# Patient Record
Sex: MALE | Race: WHITE | NOT HISPANIC OR LATINO | Employment: UNEMPLOYED | URBAN - METROPOLITAN AREA
[De-identification: names, ages, dates, MRNs, and addresses within clinical notes are randomized per-mention and may not be internally consistent; named-entity substitution may affect disease eponyms.]

---

## 2020-01-24 ENCOUNTER — HISTORICAL (OUTPATIENT)
Dept: LAB | Facility: HOSPITAL | Age: 40
End: 2020-01-24

## 2020-01-24 LAB
ABS NEUT (OLG): 4 X10(3)/MCL (ref 1.5–6.9)
ALBUMIN SERPL-MCNC: 4 GM/DL (ref 3.4–5)
ALBUMIN/GLOB SERPL: 1 RATIO
ALP SERPL-CCNC: 72 UNIT/L (ref 30–113)
ALT SERPL-CCNC: 21 UNIT/L (ref 10–45)
AST SERPL-CCNC: 23 UNIT/L (ref 15–37)
BASOPHILS # BLD AUTO: 0.1 X10(3)/MCL (ref 0–0.1)
BASOPHILS NFR BLD AUTO: 1 % (ref 0–1)
BILIRUB SERPL-MCNC: 0.7 MG/DL (ref 0.1–0.9)
BILIRUBIN DIRECT+TOT PNL SERPL-MCNC: 0.1 MG/DL (ref 0–0.3)
BILIRUBIN DIRECT+TOT PNL SERPL-MCNC: 0.6 MG/DL
BUN SERPL-MCNC: 17 MG/DL (ref 10–20)
CALCIUM SERPL-MCNC: 8.9 MG/DL (ref 8–10.5)
CHLORIDE SERPL-SCNC: 105 MMOL/L (ref 100–108)
CHOLEST SERPL-MCNC: 157 MG/DL (ref 140–200)
CHOLEST/HDLC SERPL: 3 MG/DL (ref 0–8)
CO2 SERPL-SCNC: 30 MMOL/L (ref 21–35)
CREAT SERPL-MCNC: 0.9 MG/DL (ref 0.7–1.3)
EOSINOPHIL # BLD AUTO: 0.1 X10(3)/MCL (ref 0–0.6)
EOSINOPHIL NFR BLD AUTO: 2 % (ref 0–5)
ERYTHROCYTE [DISTWIDTH] IN BLOOD BY AUTOMATED COUNT: 11.7 % (ref 11.5–17)
GLOBULIN SER-MCNC: 3.9 GM/DL
GLUCOSE SERPL-MCNC: 86 MG/DL (ref 75–116)
HCT VFR BLD AUTO: 42.2 % (ref 42–52)
HDLC SERPL-MCNC: 46 MG/DL (ref 35–59)
HGB BLD-MCNC: 14 GM/DL (ref 14–18)
HIV 1+2 AB+HIV1 P24 AG SERPL QL IA: NEGATIVE
IMM GRANULOCYTES # BLD AUTO: 0.03 10*3/UL (ref 0–0.02)
IMM GRANULOCYTES NFR BLD AUTO: 0.4 % (ref 0–0.43)
LDLC SERPL CALC-MCNC: 104 MG/DL (ref 100–129)
LYMPHOCYTES # BLD AUTO: 2.6 X10(3)/MCL (ref 0.5–4.1)
LYMPHOCYTES NFR BLD AUTO: 34 % (ref 15–40)
MCH RBC QN AUTO: 31 PG (ref 27–34)
MCHC RBC AUTO-ENTMCNC: 33 GM/DL (ref 31–36)
MCV RBC AUTO: 95 FL (ref 80–99)
MONOCYTES # BLD AUTO: 0.8 X10(3)/MCL (ref 0–1.1)
MONOCYTES NFR BLD AUTO: 10 % (ref 4–12)
NEUTROPHILS # BLD AUTO: 4 X10(3)/MCL (ref 1.5–6.9)
NEUTROPHILS NFR BLD AUTO: 52 % (ref 43–75)
PLATELET # BLD AUTO: 360 X10(3)/MCL (ref 140–400)
PMV BLD AUTO: 9.2 FL (ref 6.8–10)
POTASSIUM SERPL-SCNC: 4 MMOL/L (ref 3.6–5.2)
PROT SERPL-MCNC: 7.9 GM/DL (ref 6.4–8.2)
RBC # BLD AUTO: 4.46 X10(6)/MCL (ref 4.7–6.1)
SODIUM SERPL-SCNC: 142 MMOL/L (ref 135–145)
T PALLIDUM AB SER QL: NORMAL
TRIGL SERPL-MCNC: 86 MG/DL (ref 35–150)
VLDLC SERPL CALC-MCNC: 17 MG/DL
WBC # SPEC AUTO: 7.6 X10(3)/MCL (ref 4.5–11.5)

## 2020-01-31 ENCOUNTER — HISTORICAL (OUTPATIENT)
Dept: LAB | Facility: HOSPITAL | Age: 40
End: 2020-01-31

## 2022-04-10 ENCOUNTER — HISTORICAL (OUTPATIENT)
Dept: ADMINISTRATIVE | Facility: HOSPITAL | Age: 42
End: 2022-04-10

## 2022-06-12 ENCOUNTER — HOSPITAL ENCOUNTER (EMERGENCY)
Facility: HOSPITAL | Age: 42
Discharge: HOME | End: 2022-06-13
Attending: INTERNAL MEDICINE
Payer: MEDICAID

## 2022-06-12 VITALS
DIASTOLIC BLOOD PRESSURE: 74 MMHG | TEMPERATURE: 98 F | HEIGHT: 68 IN | HEART RATE: 66 BPM | WEIGHT: 141.81 LBS | SYSTOLIC BLOOD PRESSURE: 121 MMHG | OXYGEN SATURATION: 97 % | BODY MASS INDEX: 21.49 KG/M2 | RESPIRATION RATE: 18 BRPM

## 2022-06-12 DIAGNOSIS — L73.2 HIDRADENITIS SUPPURATIVA OF LEFT AXILLA: Primary | ICD-10-CM

## 2022-06-12 DIAGNOSIS — F19.10 POLYSUBSTANCE ABUSE: ICD-10-CM

## 2022-06-12 DIAGNOSIS — R53.1 GENERALIZED WEAKNESS: ICD-10-CM

## 2022-06-12 LAB
ALBUMIN SERPL-MCNC: 3.4 GM/DL (ref 3.5–5)
ALBUMIN/GLOB SERPL: 1 RATIO (ref 1.1–2)
ALP SERPL-CCNC: 66 UNIT/L (ref 40–150)
ALT SERPL-CCNC: 14 UNIT/L (ref 0–55)
AMPHET UR QL SCN: POSITIVE
APPEARANCE UR: CLEAR
AST SERPL-CCNC: 18 UNIT/L (ref 5–34)
BARBITURATE SCN PRESENT UR: NEGATIVE
BASOPHILS # BLD AUTO: 0.06 X10(3)/MCL (ref 0–0.2)
BASOPHILS NFR BLD AUTO: 0.4 %
BENZODIAZ UR QL SCN: NEGATIVE
BILIRUB UR QL STRIP.AUTO: NEGATIVE MG/DL
BILIRUBIN DIRECT+TOT PNL SERPL-MCNC: 0.5 MG/DL
BUN SERPL-MCNC: 19 MG/DL (ref 8.9–20.6)
CALCIUM SERPL-MCNC: 9.4 MG/DL (ref 8.4–10.2)
CANNABINOIDS UR QL SCN: POSITIVE
CHLORIDE SERPL-SCNC: 105 MMOL/L (ref 98–107)
CO2 SERPL-SCNC: 28 MMOL/L (ref 22–29)
COCAINE UR QL SCN: NEGATIVE
COLOR UR AUTO: YELLOW
CREAT SERPL-MCNC: 1.32 MG/DL (ref 0.73–1.18)
EOSINOPHIL # BLD AUTO: 0.18 X10(3)/MCL (ref 0–0.9)
EOSINOPHIL NFR BLD AUTO: 1.1 %
ERYTHROCYTE [DISTWIDTH] IN BLOOD BY AUTOMATED COUNT: 12.2 % (ref 11.5–17)
FENTANYL UR QL SCN: NEGATIVE
GLOBULIN SER-MCNC: 3.3 GM/DL (ref 2.4–3.5)
GLUCOSE SERPL-MCNC: 103 MG/DL (ref 74–100)
GLUCOSE UR QL STRIP.AUTO: NEGATIVE MG/DL
HCT VFR BLD AUTO: 34.6 % (ref 42–52)
HGB BLD-MCNC: 11.7 GM/DL (ref 14–18)
IMM GRANULOCYTES # BLD AUTO: 0.12 X10(3)/MCL (ref 0–0.02)
IMM GRANULOCYTES NFR BLD AUTO: 0.8 % (ref 0–0.43)
KETONES UR QL STRIP.AUTO: NEGATIVE MG/DL
LEUKOCYTE ESTERASE UR QL STRIP.AUTO: NEGATIVE UNIT/L
LYMPHOCYTES # BLD AUTO: 2.06 X10(3)/MCL (ref 0.6–4.6)
LYMPHOCYTES NFR BLD AUTO: 12.9 %
MCH RBC QN AUTO: 32 PG (ref 27–31)
MCHC RBC AUTO-ENTMCNC: 33.8 MG/DL (ref 33–36)
MCV RBC AUTO: 94.5 FL (ref 80–94)
MDMA UR QL SCN: NEGATIVE
MONOCYTES # BLD AUTO: 1.09 X10(3)/MCL (ref 0.1–1.3)
MONOCYTES NFR BLD AUTO: 6.8 %
NEUTROPHILS # BLD AUTO: 12.4 X10(3)/MCL (ref 2.1–9.2)
NEUTROPHILS NFR BLD AUTO: 78 %
NITRITE UR QL STRIP.AUTO: NEGATIVE
OPIATES UR QL SCN: NEGATIVE
PCP UR QL: NEGATIVE
PH UR STRIP.AUTO: 6 [PH]
PH UR: 6 [PH] (ref 3–11)
PLATELET # BLD AUTO: 337 X10(3)/MCL (ref 130–400)
PMV BLD AUTO: 8.9 FL (ref 9.4–12.4)
POTASSIUM SERPL-SCNC: 4 MMOL/L (ref 3.5–5.1)
PROT SERPL-MCNC: 6.7 GM/DL (ref 6.4–8.3)
PROT UR QL STRIP.AUTO: NEGATIVE MG/DL
RBC # BLD AUTO: 3.66 X10(6)/MCL (ref 4.7–6.1)
RBC UR QL AUTO: NEGATIVE UNIT/L
SODIUM SERPL-SCNC: 140 MMOL/L (ref 136–145)
SP GR UR STRIP.AUTO: >=1.03
SPECIFIC GRAVITY, URINE AUTO (.000) (OHS): >=1.03 (ref 1–1.03)
UROBILINOGEN UR STRIP-ACNC: 0.2 MG/DL
WBC # SPEC AUTO: 15.9 X10(3)/MCL (ref 4.5–11.5)

## 2022-06-12 PROCEDURE — 80053 COMPREHEN METABOLIC PANEL: CPT | Performed by: INTERNAL MEDICINE

## 2022-06-12 PROCEDURE — 81003 URINALYSIS AUTO W/O SCOPE: CPT | Performed by: INTERNAL MEDICINE

## 2022-06-12 PROCEDURE — 36415 COLL VENOUS BLD VENIPUNCTURE: CPT | Performed by: INTERNAL MEDICINE

## 2022-06-12 PROCEDURE — 85025 COMPLETE CBC W/AUTO DIFF WBC: CPT | Performed by: INTERNAL MEDICINE

## 2022-06-12 PROCEDURE — 80307 DRUG TEST PRSMV CHEM ANLYZR: CPT | Performed by: INTERNAL MEDICINE

## 2022-06-12 PROCEDURE — 99283 EMERGENCY DEPT VISIT LOW MDM: CPT | Mod: 25

## 2022-06-12 RX ORDER — DOXYCYCLINE 100 MG/1
100 CAPSULE ORAL 2 TIMES DAILY
Qty: 20 CAPSULE | Refills: 0 | Status: SHIPPED | OUTPATIENT
Start: 2022-06-12 | End: 2022-06-22

## 2022-06-12 RX ORDER — DOXYCYCLINE HYCLATE 100 MG
100 TABLET ORAL ONCE
Status: COMPLETED | OUTPATIENT
Start: 2022-06-13 | End: 2022-06-13

## 2022-06-13 PROCEDURE — 25000003 PHARM REV CODE 250: Performed by: INTERNAL MEDICINE

## 2022-06-13 RX ADMIN — DOXYCYCLINE HYCLATE 100 MG: 100 TABLET ORAL at 12:06

## 2022-06-13 NOTE — ED PROVIDER NOTES
Encounter Date: 6/12/2022       History     Chief Complaint   Patient presents with    Fatigue     Pt states began 3 days ago with weakness, dysuria, and abscess to L axilla.      41-year-old white gentleman presents emergency department complaining of 3 days a weakness and pain with a knot in his left arm pit.  Patient admits to doing drugs for the last several days        Review of patient's allergies indicates:  No Known Allergies  No past medical history on file.  No past surgical history on file.  No family history on file.  Social History     Tobacco Use    Smoking status: Current Every Day Smoker     Packs/day: 1.00     Types: Cigarettes    Smokeless tobacco: Never Used   Substance Use Topics    Drug use: Not Currently     Comment: suboxone     Review of Systems   Constitutional: Positive for fatigue. Negative for activity change, appetite change, chills, diaphoresis, fever and unexpected weight change.   HENT: Negative.  Negative for congestion, dental problem, drooling, ear discharge, ear pain, facial swelling, hearing loss, mouth sores, nosebleeds, postnasal drip, rhinorrhea, sinus pressure, sinus pain, sneezing, sore throat, tinnitus, trouble swallowing and voice change.    Eyes: Negative.  Negative for photophobia, pain, discharge, redness, itching and visual disturbance.   Respiratory: Negative.  Negative for apnea, cough, choking, chest tightness, shortness of breath, wheezing and stridor.    Cardiovascular: Negative.  Negative for chest pain, palpitations and leg swelling.   Gastrointestinal: Negative.  Negative for abdominal distention, abdominal pain, anal bleeding, blood in stool, constipation, diarrhea, nausea, rectal pain and vomiting.   Endocrine: Negative.  Negative for cold intolerance, heat intolerance, polydipsia, polyphagia and polyuria.   Genitourinary: Negative.  Negative for decreased urine volume, difficulty urinating, dysuria, enuresis, flank pain, frequency, genital sores,  hematuria, penile discharge, penile pain, penile swelling, scrotal swelling, testicular pain and urgency.   Musculoskeletal: Negative.  Negative for arthralgias, back pain, gait problem, joint swelling, myalgias, neck pain and neck stiffness.   Skin: Negative.  Negative for color change, pallor, rash and wound.        Red rash in left arm pit   Allergic/Immunologic: Negative.  Negative for environmental allergies, food allergies and immunocompromised state.   Neurological: Negative.  Negative for dizziness, tremors, seizures, syncope, facial asymmetry, speech difficulty, weakness, light-headedness, numbness and headaches.   Hematological: Negative.  Negative for adenopathy. Does not bruise/bleed easily.   Psychiatric/Behavioral: Negative.  Negative for agitation, behavioral problems, confusion, decreased concentration, dysphoric mood, hallucinations, self-injury, sleep disturbance and suicidal ideas. The patient is not nervous/anxious and is not hyperactive.    All other systems reviewed and are negative.      Physical Exam     Initial Vitals [06/12/22 2141]   BP Pulse Resp Temp SpO2   115/68 74 18 98.2 °F (36.8 °C) 98 %      MAP       --         Physical Exam    Nursing note and vitals reviewed.  Constitutional: He appears well-developed and well-nourished.   HENT:   Head: Normocephalic and atraumatic.   Eyes: Conjunctivae and EOM are normal. Pupils are equal, round, and reactive to light.   Neck: Neck supple.   Normal range of motion.  Cardiovascular: Normal rate and regular rhythm.   Pulmonary/Chest: Breath sounds normal.   Abdominal: Abdomen is soft. Bowel sounds are normal.   Musculoskeletal:         General: Normal range of motion.      Cervical back: Normal range of motion and neck supple.     Neurological: He is alert and oriented to person, place, and time.   Skin: Skin is warm and dry. Capillary refill takes less than 2 seconds.   Hidradenitis left axilla   Psychiatric: He has a normal mood and affect. His  behavior is normal. Judgment and thought content normal.         ED Course   Procedures     Admission on 06/12/2022   Component Date Value Ref Range Status    Color, UA 06/12/2022 Yellow  Yellow, Colorless, Other, Clear Final    Appearance, UA 06/12/2022 Clear  Clear Final    Specific Gravity, UA 06/12/2022 >=1.030   Final    pH, UA 06/12/2022 6.0  5.0, 5.5, 6.0, 6.5, 7.0, 7.5, 8.0, 8.5 Final    Protein, UA 06/12/2022 Negative  Negative, 300  mg/dL Final    Glucose, UA 06/12/2022 Negative  Negative, Normal mg/dL Final    Ketones, UA 06/12/2022 Negative  Negative, +1, +2, +3, +4, +5, >=160, >=80 mg/dL Final    Blood, UA 06/12/2022 Negative  Negative unit/L Final    Bilirubin, UA 06/12/2022 Negative  Negative mg/dL Final    Urobilinogen, UA 06/12/2022 0.2  0.2, 1.0, Normal mg/dL Final    Nitrites, UA 06/12/2022 Negative  Negative Final    Leukocyte Esterase, UA 06/12/2022 Negative  Negative, 75  unit/L Final    Amphetamines, Urine 06/12/2022 Positive (A) Negative Final    Barbituates, Urine 06/12/2022 Negative  Negative Final    Benzodiazepine, Urine 06/12/2022 Negative  Negative Final    Cannabinoids, Urine 06/12/2022 Positive (A) Negative Final    Cocaine, Urine 06/12/2022 Negative  Negative Final    Fentanyl, Urine 06/12/2022 Negative  Negative Final    MDMA, Urine 06/12/2022 Negative  Negative Final    Opiates, Urine 06/12/2022 Negative  Negative Final    Phencyclidine, Urine 06/12/2022 Negative  Negative Final    pH, Urine 06/12/2022 6.0  3.0 - 11.0 Final    Specific Gravity, Urine Auto 06/12/2022 >=1.030  1.001 - 1.035 Final    Sodium Level 06/12/2022 140  136 - 145 mmol/L Final    Potassium Level 06/12/2022 4.0  3.5 - 5.1 mmol/L Final    Chloride 06/12/2022 105  98 - 107 mmol/L Final    Carbon Dioxide 06/12/2022 28  22 - 29 mmol/L Final    Glucose Level 06/12/2022 103 (A) 74 - 100 mg/dL Final    Blood Urea Nitrogen 06/12/2022 19.0  8.9 - 20.6 mg/dL Final    Creatinine 06/12/2022  1.32 (A) 0.73 - 1.18 mg/dL Final    Calcium Level Total 06/12/2022 9.4  8.4 - 10.2 mg/dL Final    Protein Total 06/12/2022 6.7  6.4 - 8.3 gm/dL Final    Albumin Level 06/12/2022 3.4 (A) 3.5 - 5.0 gm/dL Final    Globulin 06/12/2022 3.3  2.4 - 3.5 gm/dL Final    Albumin/Globulin Ratio 06/12/2022 1.0 (A) 1.1 - 2.0 ratio Final    Bilirubin Total 06/12/2022 0.5  <=1.5 mg/dL Final    Alkaline Phosphatase 06/12/2022 66  40 - 150 unit/L Final    Alanine Aminotransferase 06/12/2022 14  0 - 55 unit/L Final    Aspartate Aminotransferase 06/12/2022 18  5 - 34 unit/L Final    Estimated GFR-Non  06/12/2022 >60  mls/min/1.73/m2 Final    WBC 06/12/2022 15.9 (A) 4.5 - 11.5 x10(3)/mcL Final    RBC 06/12/2022 3.66 (A) 4.70 - 6.10 x10(6)/mcL Final    Hgb 06/12/2022 11.7 (A) 14.0 - 18.0 gm/dL Final    Hct 06/12/2022 34.6 (A) 42.0 - 52.0 % Final    MCV 06/12/2022 94.5 (A) 80.0 - 94.0 fL Final    MCH 06/12/2022 32.0 (A) 27.0 - 31.0 pg Final    MCHC 06/12/2022 33.8  33.0 - 36.0 mg/dL Final    RDW 06/12/2022 12.2  11.5 - 17.0 % Final    Platelet 06/12/2022 337  130 - 400 x10(3)/mcL Final    MPV 06/12/2022 8.9 (A) 9.4 - 12.4 fL Final    Neut % 06/12/2022 78.0  % Final    Lymph % 06/12/2022 12.9  % Final    Mono % 06/12/2022 6.8  % Final    Eos % 06/12/2022 1.1  % Final    Basophil % 06/12/2022 0.4  % Final    Lymph # 06/12/2022 2.06  0.6 - 4.6 x10(3)/mcL Final    Neut # 06/12/2022 12.4 (A) 2.1 - 9.2 x10(3)/mcL Final    Mono # 06/12/2022 1.09  0.1 - 1.3 x10(3)/mcL Final    Eos # 06/12/2022 0.18  0 - 0.9 x10(3)/mcL Final    Baso # 06/12/2022 0.06  0 - 0.2 x10(3)/mcL Final    IG# 06/12/2022 0.12 (A) 0 - 0.0155 x10(3)/mcL Final    IG% 06/12/2022 0.8 (A) 0 - 0.43 % Final       Labs Reviewed   DRUG SCREEN, URINE (BEAKER) - Abnormal; Notable for the following components:       Result Value    Amphetamines, Urine Positive (*)     Cannabinoids, Urine Positive (*)     All other components within normal  limits    Narrative:     Cut off concentrations:    Amphetamines - 1000 ng/ml  Barbiturates - 200 ng/ml  Benzodiazepine - 200 ng/ml  Cannabinoids (THC) - 50 ng/ml  Cocaine - 300 ng/ml  Fentanyl - 1.0 ng/ml  MDMA - 500 ng/ml  Opiates - 300 ng/ml   Phencyclidine (PCP) - 25 ng/ml    Specimen submitted for drug analysis and tested for pH and specific gravity in order to evaluate sample integrity. Suspect tampering if specific gravity is <1.003 and/or pH is not within the range of 4.5 - 8.0  False negatives may result form substances such as bleach added to urine.  False positives may result for the presence of a substance with similar chemical structure to the drug or its metabolite.    This test provides only a PRELIMINARY analytical test result. A more specific alternate chemical method must be used in order to obtain a confirmed analytical result. Gas chromatography/mass spectrometry (GC/MS) is the preferred confirmatory method. Other chemical confirmation methods are available. Clinical consideration and professional judgement should be applied to any drug of abuse test result, particularly when preliminary positive results are used.    Positive results will be confirmed only at the physicians request. Unconfirmed screening results are to be used only for medical purposes (treatment).        COMPREHENSIVE METABOLIC PANEL - Abnormal; Notable for the following components:    Glucose Level 103 (*)     Creatinine 1.32 (*)     Albumin Level 3.4 (*)     Albumin/Globulin Ratio 1.0 (*)     All other components within normal limits   CBC WITH DIFFERENTIAL - Abnormal; Notable for the following components:    WBC 15.9 (*)     RBC 3.66 (*)     Hgb 11.7 (*)     Hct 34.6 (*)     MCV 94.5 (*)     MCH 32.0 (*)     MPV 8.9 (*)     Neut # 12.4 (*)     IG# 0.12 (*)     IG% 0.8 (*)     All other components within normal limits   URINALYSIS   CBC W/ AUTO DIFFERENTIAL    Narrative:     The following orders were created for panel order  CBC auto differential.  Procedure                               Abnormality         Status                     ---------                               -----------         ------                     CBC with Differential[[id number]]        Abnormal            Final result                 Please view results for these tests on the individual orders.          Imaging Results    None          Medications   doxycycline capsule 100 mg (has no administration in time range)                          Clinical Impression:   Final diagnoses:  [L73.2] Hidradenitis suppurativa of left axilla (Primary)  [R53.1] Generalized weakness  [F19.10] Polysubstance abuse          ED Disposition Condition    Discharge Stable        ED Prescriptions     Medication Sig Dispense Start Date End Date Auth. Provider    doxycycline (VIBRAMYCIN) 100 MG Cap Take 1 capsule (100 mg total) by mouth 2 (two) times daily. for 10 days 20 capsule 6/12/2022 6/22/2022 Spike Gallego MD        Follow-up Information     Follow up With Specialties Details Why Contact Info    Brittney Preston MD Family Medicine In 1 week  [address] [place] [address]  [phone]        Lashaun Newman is a certified MA and was present during the entire interaction with this patient     Spike Gallego MD  06/12/22 2651

## 2022-08-24 ENCOUNTER — HOSPITAL ENCOUNTER (EMERGENCY)
Facility: HOSPITAL | Age: 42
Discharge: HOME | End: 2022-08-24
Attending: EMERGENCY MEDICINE
Payer: MEDICAID

## 2022-08-24 VITALS
HEART RATE: 78 BPM | BODY MASS INDEX: 22.73 KG/M2 | HEIGHT: 68 IN | OXYGEN SATURATION: 99 % | TEMPERATURE: 98 F | RESPIRATION RATE: 18 BRPM | SYSTOLIC BLOOD PRESSURE: 132 MMHG | WEIGHT: 150 LBS | DIASTOLIC BLOOD PRESSURE: 74 MMHG

## 2022-08-24 DIAGNOSIS — R05.8 RESPIRATORY TRACT CONGESTION WITH COUGH: Primary | ICD-10-CM

## 2022-08-24 DIAGNOSIS — R09.A2 FOREIGN BODY SENSATION IN THROAT: ICD-10-CM

## 2022-08-24 DIAGNOSIS — R07.9 CHEST PAIN: ICD-10-CM

## 2022-08-24 DIAGNOSIS — R52 PAIN: ICD-10-CM

## 2022-08-24 LAB
ALBUMIN SERPL BCP-MCNC: 4 G/DL (ref 3.5–5.2)
ALP SERPL-CCNC: 75 U/L (ref 55–135)
ALT SERPL W/O P-5'-P-CCNC: 13 U/L (ref 10–44)
ANION GAP SERPL CALC-SCNC: 12 MMOL/L (ref 8–16)
AST SERPL-CCNC: 19 U/L (ref 10–40)
BASOPHILS # BLD AUTO: 0.07 K/UL (ref 0–0.2)
BASOPHILS NFR BLD: 0.6 % (ref 0–1.9)
BILIRUB SERPL-MCNC: 0.4 MG/DL (ref 0.1–1)
BUN SERPL-MCNC: 14 MG/DL (ref 6–20)
CALCIUM SERPL-MCNC: 9.3 MG/DL (ref 8.7–10.5)
CHLORIDE SERPL-SCNC: 107 MMOL/L (ref 95–110)
CO2 SERPL-SCNC: 23 MMOL/L (ref 23–29)
CREAT SERPL-MCNC: 1.2 MG/DL (ref 0.5–1.4)
DIFFERENTIAL METHOD: ABNORMAL
EOSINOPHIL # BLD AUTO: 0.3 K/UL (ref 0–0.5)
EOSINOPHIL NFR BLD: 2.1 % (ref 0–8)
ERYTHROCYTE [DISTWIDTH] IN BLOOD BY AUTOMATED COUNT: 12.5 % (ref 11.5–14.5)
EST. GFR  (NO RACE VARIABLE): >60 ML/MIN/1.73 M^2
GLUCOSE SERPL-MCNC: 99 MG/DL (ref 70–110)
HCT VFR BLD AUTO: 40.7 % (ref 40–54)
HGB BLD-MCNC: 13.6 G/DL (ref 14–18)
IMM GRANULOCYTES # BLD AUTO: 0.06 K/UL (ref 0–0.04)
IMM GRANULOCYTES NFR BLD AUTO: 0.5 % (ref 0–0.5)
LYMPHOCYTES # BLD AUTO: 1.9 K/UL (ref 1–4.8)
LYMPHOCYTES NFR BLD: 15.6 % (ref 18–48)
MCH RBC QN AUTO: 31.9 PG (ref 27–31)
MCHC RBC AUTO-ENTMCNC: 33.4 G/DL (ref 32–36)
MCV RBC AUTO: 96 FL (ref 82–98)
MONOCYTES # BLD AUTO: 1.4 K/UL (ref 0.3–1)
MONOCYTES NFR BLD: 11.3 % (ref 4–15)
NEUTROPHILS # BLD AUTO: 8.5 K/UL (ref 1.8–7.7)
NEUTROPHILS NFR BLD: 69.9 % (ref 38–73)
NRBC BLD-RTO: 0 /100 WBC
PLATELET # BLD AUTO: 386 K/UL (ref 150–450)
PMV BLD AUTO: 8.9 FL (ref 9.2–12.9)
POTASSIUM SERPL-SCNC: 4 MMOL/L (ref 3.5–5.1)
PROT SERPL-MCNC: 7.7 G/DL (ref 6–8.4)
RBC # BLD AUTO: 4.26 M/UL (ref 4.6–6.2)
SARS-COV-2 RDRP RESP QL NAA+PROBE: NEGATIVE
SODIUM SERPL-SCNC: 142 MMOL/L (ref 136–145)
TROPONIN I SERPL DL<=0.01 NG/ML-MCNC: <0.006 NG/ML (ref 0–0.03)
WBC # BLD AUTO: 12.15 K/UL (ref 3.9–12.7)

## 2022-08-24 PROCEDURE — U0002 COVID-19 LAB TEST NON-CDC: HCPCS | Performed by: EMERGENCY MEDICINE

## 2022-08-24 PROCEDURE — 96372 THER/PROPH/DIAG INJ SC/IM: CPT | Performed by: EMERGENCY MEDICINE

## 2022-08-24 PROCEDURE — 93010 ELECTROCARDIOGRAM REPORT: CPT | Mod: ,,, | Performed by: INTERNAL MEDICINE

## 2022-08-24 PROCEDURE — 93010 EKG 12-LEAD: ICD-10-PCS | Mod: ,,, | Performed by: INTERNAL MEDICINE

## 2022-08-24 PROCEDURE — 93005 ELECTROCARDIOGRAM TRACING: CPT

## 2022-08-24 PROCEDURE — 85025 COMPLETE CBC W/AUTO DIFF WBC: CPT | Performed by: NURSE PRACTITIONER

## 2022-08-24 PROCEDURE — 84484 ASSAY OF TROPONIN QUANT: CPT | Performed by: NURSE PRACTITIONER

## 2022-08-24 PROCEDURE — 80053 COMPREHEN METABOLIC PANEL: CPT | Performed by: NURSE PRACTITIONER

## 2022-08-24 PROCEDURE — 99285 EMERGENCY DEPT VISIT HI MDM: CPT | Mod: 25

## 2022-08-24 PROCEDURE — 63600175 PHARM REV CODE 636 W HCPCS: Performed by: EMERGENCY MEDICINE

## 2022-08-24 RX ORDER — DEXAMETHASONE SODIUM PHOSPHATE 4 MG/ML
8 INJECTION, SOLUTION INTRA-ARTICULAR; INTRALESIONAL; INTRAMUSCULAR; INTRAVENOUS; SOFT TISSUE
Status: COMPLETED | OUTPATIENT
Start: 2022-08-24 | End: 2022-08-24

## 2022-08-24 RX ADMIN — DEXAMETHASONE SODIUM PHOSPHATE 8 MG: 4 INJECTION INTRA-ARTICULAR; INTRALESIONAL; INTRAMUSCULAR; INTRAVENOUS; SOFT TISSUE at 10:08

## 2022-08-25 NOTE — ED NOTES
Pt to ED c/o nasal congestion and chest pain for one day. States pain increases with coughing. Pt states he took a home covid test and it was negative. Pt denies SOB, NV. Placed on monitor NAD noted. Awaiting dispo/orders.

## 2022-08-25 NOTE — ED PROVIDER NOTES
SCRIBE #1 NOTE: I, Ash Hamptond, am scribing for, and in the presence of, Yasir Torres MD. I have scribed the entire note.       History     Chief Complaint   Patient presents with    Chest Pain     L sided, only when coughing. States he ate fish last night and thinks he swallowed a bone causing the pain. Denies SOB. +congestion. Denies sick contacts     Review of patient's allergies indicates:  No Known Allergies      History of Present Illness     HPI    8/24/2022, 8:59 PM  History obtained from the patient      History of Present Illness: Sulaiman Blount Jr. is a 41 y.o. male patient who presents to the Emergency Department for evaluation of CP which onset gradually x this morning. Pt thinks the pain is coming from a fish bone that he might have swallowed last night. Symptoms are constant and moderate in severity. No mitigating or exacerbating factors reported. Associated sxs include ST, rhinorrhea, cough, and congestion. Patient denies any f/c, n/v, SOB, HA, abd pain , and all other sxs at this time. No prior Tx reported. No further complaints or concerns at this time.       Arrival mode: Personal vehicle    PCP: Brittney Preston MD        Past Medical History:  No past medical history on file.    Past Surgical History:  No past surgical history on file.      Family History:  No family history on file.    Social History:  Social History     Tobacco Use    Smoking status: Current Every Day Smoker     Packs/day: 1.00     Types: Cigarettes    Smokeless tobacco: Never Used   Substance and Sexual Activity    Alcohol use: Not on file    Drug use: Not Currently     Comment: suboxone    Sexual activity: Not on file        Review of Systems     Review of Systems   Constitutional: Negative for chills and fever.   HENT: Positive for congestion, rhinorrhea and sore throat.    Respiratory: Negative for shortness of breath.    Cardiovascular: Positive for chest pain.   Gastrointestinal: Negative for abdominal pain, nausea and  vomiting.   Genitourinary: Negative for dysuria.   Musculoskeletal: Negative for back pain.   Skin: Negative for rash.   Neurological: Negative for weakness and headaches.   Hematological: Does not bruise/bleed easily.   All other systems reviewed and are negative.       Physical Exam     Initial Vitals [08/24/22 1930]   BP Pulse Resp Temp SpO2   (!) 147/78 85 18 98.2 °F (36.8 °C) 98 %      MAP       --          Physical Exam  Nursing Notes and Vital Signs Reviewed.  Constitutional: Patient is in no acute distress. Well-developed and well-nourished.  Head: Atraumatic. Normocephalic.  Eyes: PERRL. EOM intact. Conjunctivae are not pale. No scleral icterus.  ENT: Mucous membranes are moist. Oropharynx is clear and symmetric.    Neck: Supple. Full ROM. No lymphadenopathy.  Cardiovascular: Regular rate. Regular rhythm. No murmurs, rubs, or gallops. Distal pulses are 2+ and symmetric.  Pulmonary/Chest: No respiratory distress. Clear to auscultation bilaterally. No wheezing or rales.  Abdominal: Soft and non-distended.  There is no tenderness.  No rebound, guarding, or rigidity. Good bowel sounds.  Genitourinary: No CVA tenderness  Musculoskeletal: Moves all extremities. No obvious deformities. No edema. No calf tenderness.  Skin: Warm and dry.  Neurological:  Alert, awake, and appropriate.  Normal speech.  No acute focal neurological deficits are appreciated.  Psychiatric: Normal affect. Good eye contact. Appropriate in content.     ED Course   Procedures  ED Vital Signs:  Vitals:    08/24/22 1930 08/24/22 2043 08/24/22 2238   BP: (!) 147/78  132/74   Pulse: 85  78   Resp: 18  18   Temp: 98.2 °F (36.8 °C)  98.1 °F (36.7 °C)   TempSrc: Oral  Oral   SpO2: 98%  99%   Weight:  68 kg (150 lb)        Abnormal Lab Results:  Labs Reviewed   CBC W/ AUTO DIFFERENTIAL - Abnormal; Notable for the following components:       Result Value    RBC 4.26 (*)     Hemoglobin 13.6 (*)     MCH 31.9 (*)     MPV 8.9 (*)     Gran # (ANC) 8.5 (*)      Immature Grans (Abs) 0.06 (*)     Mono # 1.4 (*)     Lymph % 15.6 (*)     All other components within normal limits   COMPREHENSIVE METABOLIC PANEL   TROPONIN I   SARS-COV-2 RNA AMPLIFICATION, QUAL        All Lab Results:  Results for orders placed or performed during the hospital encounter of 08/24/22   CBC auto differential   Result Value Ref Range    WBC 12.15 3.90 - 12.70 K/uL    RBC 4.26 (L) 4.60 - 6.20 M/uL    Hemoglobin 13.6 (L) 14.0 - 18.0 g/dL    Hematocrit 40.7 40.0 - 54.0 %    MCV 96 82 - 98 fL    MCH 31.9 (H) 27.0 - 31.0 pg    MCHC 33.4 32.0 - 36.0 g/dL    RDW 12.5 11.5 - 14.5 %    Platelets 386 150 - 450 K/uL    MPV 8.9 (L) 9.2 - 12.9 fL    Immature Granulocytes 0.5 0.0 - 0.5 %    Gran # (ANC) 8.5 (H) 1.8 - 7.7 K/uL    Immature Grans (Abs) 0.06 (H) 0.00 - 0.04 K/uL    Lymph # 1.9 1.0 - 4.8 K/uL    Mono # 1.4 (H) 0.3 - 1.0 K/uL    Eos # 0.3 0.0 - 0.5 K/uL    Baso # 0.07 0.00 - 0.20 K/uL    nRBC 0 0 /100 WBC    Gran % 69.9 38.0 - 73.0 %    Lymph % 15.6 (L) 18.0 - 48.0 %    Mono % 11.3 4.0 - 15.0 %    Eosinophil % 2.1 0.0 - 8.0 %    Basophil % 0.6 0.0 - 1.9 %    Differential Method Automated    Comprehensive metabolic panel   Result Value Ref Range    Sodium 142 136 - 145 mmol/L    Potassium 4.0 3.5 - 5.1 mmol/L    Chloride 107 95 - 110 mmol/L    CO2 23 23 - 29 mmol/L    Glucose 99 70 - 110 mg/dL    BUN 14 6 - 20 mg/dL    Creatinine 1.2 0.5 - 1.4 mg/dL    Calcium 9.3 8.7 - 10.5 mg/dL    Total Protein 7.7 6.0 - 8.4 g/dL    Albumin 4.0 3.5 - 5.2 g/dL    Total Bilirubin 0.4 0.1 - 1.0 mg/dL    Alkaline Phosphatase 75 55 - 135 U/L    AST 19 10 - 40 U/L    ALT 13 10 - 44 U/L    Anion Gap 12 8 - 16 mmol/L    eGFR >60 >60 mL/min/1.73 m^2   Troponin I   Result Value Ref Range    Troponin I <0.006 0.000 - 0.026 ng/mL   COVID-19 Rapid Screening   Result Value Ref Range    SARS-CoV-2 RNA, Amplification, Qual Negative Negative         Imaging Results:  Imaging Results          X-Ray Neck Soft Tissue (Final result)   Result time 08/24/22 21:36:52    Final result by Rehan Roque MD (08/24/22 21:36:52)                 Impression:      No radiopaque foreign body identified.      Electronically signed by: Rehan Roque  Date:    08/24/2022  Time:    21:36             Narrative:    EXAMINATION:  XR NECK SOFT TISSUE    CLINICAL HISTORY:  Other specified symptoms and signs involving the circulatory and respiratory systems    TECHNIQUE:  AP and lateral soft tissue views the neck were performed.    COMPARISON:  None.    FINDINGS:  No fracture.  No traumatic malalignment.  No osseous destructive process.  Multifocal degenerative changes throughout the cervical spine.    No prevertebral soft tissue swelling.  No definite radiopaque foreign body identified.                               X-Ray Chest AP Portable (Final result)  Result time 08/24/22 20:03:07    Final result by Rehan Roque MD (08/24/22 20:03:07)                 Impression:      No acute abnormality.      Electronically signed by: Rehan Roque  Date:    08/24/2022  Time:    20:03             Narrative:    EXAMINATION:  XR CHEST AP PORTABLE    CLINICAL HISTORY:  Chest Pain;    TECHNIQUE:  Single frontal view of the chest was performed.    COMPARISON:  None    FINDINGS:  The lungs are clear, with normal appearance of pulmonary vasculature and no pleural effusion or pneumothorax.    The cardiac silhouette is normal in size. The hilar and mediastinal contours are unremarkable.    Bones are intact.                                 The EKG was ordered, reviewed, and independently interpreted by the ED provider.  Interpretation time: 19:24  Rate: 87 BPM  Rhythm: normal sinus rhythm  Interpretation: No acute ST changes. No STEMI.             The Emergency Provider reviewed the vital signs and test results, which are outlined above.     ED Discussion       10:02 PM: Reassessed pt at this time. Discussed with pt all pertinent ED information and results. Discussed pt dx and plan  of tx. Gave pt all f/u and return to the ED instructions. All questions and concerns were addressed at this time. Pt expresses understanding of information and instructions, and is comfortable with plan to discharge. Pt is stable for discharge.    I discussed with patient and/or family/caretaker that evaluation in the ED does not suggest any emergent or life threatening medical conditions requiring immediate intervention beyond what was provided in the ED, and I believe patient is safe for discharge.  Regardless, an unremarkable evaluation in the ED does not preclude the development or presence of a serious of life threatening condition. As such, patient was instructed to return immediately for any worsening or change in current symptoms.         Medical Decision Making:   Clinical Tests:   Lab Tests: Ordered and Reviewed  Radiological Study: Ordered and Reviewed  Medical Tests: Ordered and Reviewed           ED Medication(s):  Medications   dexamethasone injection 8 mg (8 mg Intramuscular Given 8/24/22 2236)       New Prescriptions    No medications on file        Follow-up Information     Brittney Preston MD In 2 days.    Specialty: Family Medicine  Contact information:  [address] [facility] [place] [address]  [phone]             [facility] - Emergency Dept..    Specialty: Emergency Medicine  Why: As needed, If symptoms worsen  Contact information:  [address] [facility] [address] [phone]                           Scribe Attestation:   Scribe #1: I performed the above scribed service and the documentation accurately describes the services I performed. I attest to the accuracy of the note.     Attending:   Physician Attestation Statement for Scribe #1: I, Yasir Torres MD, personally performed the services described in this documentation, as scribed by Ash Collazo, in my presence, and it is both accurate and complete.           Clinical Impression       ICD-10-CM ICD-9-CM   1. Respiratory  tract congestion with cough  R05.8 786.2   2. Pain  R52 780.96   3. Chest pain  R07.9 786.50   4. Foreign body sensation in throat  R09.89 784.99       Disposition:   Disposition: Discharged  Condition: Stable         Yasir Torres MD  08/24/22 7824

## 2024-06-16 ENCOUNTER — HOSPITAL ENCOUNTER (EMERGENCY)
Facility: HOSPITAL | Age: 44
Discharge: HOME | End: 2024-06-16
Attending: EMERGENCY MEDICINE
Payer: MEDICAID

## 2024-06-16 VITALS
SYSTOLIC BLOOD PRESSURE: 111 MMHG | BODY MASS INDEX: 22.69 KG/M2 | RESPIRATION RATE: 18 BRPM | HEART RATE: 74 BPM | DIASTOLIC BLOOD PRESSURE: 63 MMHG | WEIGHT: 167.56 LBS | OXYGEN SATURATION: 98 % | TEMPERATURE: 98 F | HEIGHT: 72 IN

## 2024-06-16 DIAGNOSIS — K08.89 PAIN, DENTAL: Primary | ICD-10-CM

## 2024-06-16 PROCEDURE — 99283 EMERGENCY DEPT VISIT LOW MDM: CPT

## 2024-06-16 RX ORDER — IBUPROFEN 800 MG/1
800 TABLET ORAL EVERY 6 HOURS PRN
Qty: 20 TABLET | Refills: 0 | Status: SHIPPED | OUTPATIENT
Start: 2024-06-16 | End: 2024-06-21

## 2024-06-16 RX ORDER — AMOXICILLIN AND CLAVULANATE POTASSIUM 875; 125 MG/1; MG/1
1 TABLET, FILM COATED ORAL 2 TIMES DAILY
Qty: 14 TABLET | Refills: 0 | Status: SHIPPED | OUTPATIENT
Start: 2024-06-16 | End: 2024-06-23

## 2024-06-16 NOTE — ED PROVIDER NOTES
Encounter Date: 6/16/2024       History     Chief Complaint   Patient presents with    Oral Swelling     Abscess to tooth x one week     Patient presents to ER for right upper dental pain, onset 1 week ago.  Denies any associated symptoms.  States pain has been constant since onset.  He denies fever, chills, generalized body aches, difficulty swallowing, shortness of breath.    The history is provided by the patient.     Review of patient's allergies indicates:  No Known Allergies  No past medical history on file.  No past surgical history on file.  No family history on file.  Social History     Tobacco Use    Smoking status: Every Day     Current packs/day: 1.00     Types: Cigarettes    Smokeless tobacco: Never   Substance Use Topics    Drug use: Not Currently     Comment: suboxone     Review of Systems   Constitutional:  Negative for chills, fatigue and fever.   HENT:  Positive for dental problem. Negative for ear pain, sinus pain and sore throat.    Eyes:  Negative for pain.   Respiratory:  Negative for cough and shortness of breath.    Cardiovascular:  Negative for chest pain.   Musculoskeletal:  Negative for back pain, myalgias and neck pain.   Skin:  Negative for rash.   Neurological:  Negative for weakness and headaches.       Physical Exam     Initial Vitals   BP Pulse Resp Temp SpO2   06/16/24 1729 06/16/24 1729 06/16/24 1729 06/16/24 1731 06/16/24 1729   111/63 74 18 98 °F (36.7 °C) 98 %      MAP       --                Physical Exam    Nursing note and vitals reviewed.  Constitutional: He appears well-nourished. He is not diaphoretic. He is cooperative.  Non-toxic appearance. He does not have a sickly appearance. He does not appear ill. No distress.   HENT:   Head: Normocephalic and atraumatic.   Right Ear: External ear normal.   Left Ear: External ear normal.   Nose: Nose normal.   Mouth/Throat: Uvula is midline, oropharynx is clear and moist and mucous membranes are normal. Dental caries present. No  dental abscesses or uvula swelling. No oropharyngeal exudate, posterior oropharyngeal edema or posterior oropharyngeal erythema.       +poor dentition.  No obvious visible dental abscess.  No intraoral edema or erythema.  No fluctuance.   Eyes: Conjunctivae are normal.   Neck: Neck supple.   Normal range of motion.  Cardiovascular:  Normal rate, regular rhythm and intact distal pulses.           Pulmonary/Chest: Breath sounds normal. No respiratory distress.   Musculoskeletal:         General: Normal range of motion.      Cervical back: Normal range of motion and neck supple.     Neurological: He is alert and oriented to person, place, and time. He has normal strength. GCS score is 15. GCS eye subscore is 4. GCS verbal subscore is 5. GCS motor subscore is 6.   Skin: Skin is warm and dry.         ED Course   Procedures  Labs Reviewed - No data to display       Imaging Results    None          Medications - No data to display  Medical Decision Making  Risk  Prescription drug management.                No obvious abscess noted on exam.  Patient is nontoxic/non ill-appearing.  Vital signs stable.  Discussed outpatient follow-up with his dentist.  Ibuprofen and Augmentin Rx provided.  Discussed signs and symptoms to return to ER.  Patient agrees with plan and voiced no further concerns.    I discussed with patient  that evaluation in the ED does not suggest any emergent or life threatening medical conditions requiring immediate intervention beyond what was provided in the ED, and I believe patient is safe for discharge. Regardless, an unremarkable evaluation in the ED does not preclude the development or presence of a serious of life threatening condition. As such, patient was instructed to return immediately for any worsening or change in current symptoms.                        Clinical Impression:  Final diagnoses:  [K08.89] Pain, dental (Primary)          ED Disposition Condition    Discharge Stable          ED  Prescriptions       Medication Sig Dispense Start Date End Date Auth. Provider    ibuprofen (ADVIL,MOTRIN) 800 MG tablet Take 1 tablet (800 mg total) by mouth every 6 (six) hours as needed for Pain. 20 tablet 6/16/2024 6/21/2024 Morteza Sutton NP    amoxicillin-clavulanate 875-125mg (AUGMENTIN) 875-125 mg per tablet Take 1 tablet by mouth 2 (two) times daily. for 7 days 14 tablet 6/16/2024 6/23/2024 Morteza Sutton NP          Follow-up Information       Follow up With Specialties Details Why Contact Info    Follow-up with your dentist in 1 day.        [facility] Dept. Emergency Medicine  As needed, If symptoms worsen [id number] [facility] [id number] [phone]             Morteza Sutton NP  06/16/24 [id number]